# Patient Record
Sex: FEMALE | Race: ASIAN
[De-identification: names, ages, dates, MRNs, and addresses within clinical notes are randomized per-mention and may not be internally consistent; named-entity substitution may affect disease eponyms.]

---

## 2019-10-31 ENCOUNTER — HOSPITAL ENCOUNTER (EMERGENCY)
Dept: HOSPITAL 74 - JER | Age: 64
Discharge: HOME | End: 2019-10-31
Payer: COMMERCIAL

## 2019-10-31 VITALS — TEMPERATURE: 98 F | HEART RATE: 63 BPM | DIASTOLIC BLOOD PRESSURE: 69 MMHG | SYSTOLIC BLOOD PRESSURE: 143 MMHG

## 2019-10-31 VITALS — BODY MASS INDEX: 23.8 KG/M2
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
## 2019-10-31 DIAGNOSIS — S52.614A: Primary | ICD-10-CM

## 2019-10-31 DIAGNOSIS — I10: ICD-10-CM

## 2019-10-31 DIAGNOSIS — E11.9: ICD-10-CM

## 2019-10-31 LAB
ALBUMIN SERPL-MCNC: 4.3 G/DL (ref 3.4–5)
ALP SERPL-CCNC: 56 U/L (ref 45–117)
ALT SERPL-CCNC: 17 U/L (ref 13–61)
ANION GAP SERPL CALC-SCNC: 7 MMOL/L (ref 8–16)
APTT BLD: 35.1 SECONDS (ref 25.2–36.5)
AST SERPL-CCNC: 12 U/L (ref 15–37)
BASOPHILS # BLD: 0.7 % (ref 0–2)
BILIRUB SERPL-MCNC: 0.3 MG/DL (ref 0.2–1)
BUN SERPL-MCNC: 17.6 MG/DL (ref 7–18)
CALCIUM SERPL-MCNC: 9.3 MG/DL (ref 8.5–10.1)
CHLORIDE SERPL-SCNC: 104 MMOL/L (ref 98–107)
CO2 SERPL-SCNC: 28 MMOL/L (ref 21–32)
CREAT SERPL-MCNC: 0.8 MG/DL (ref 0.55–1.3)
DEPRECATED RDW RBC AUTO: 14.4 % (ref 11.6–15.6)
EOSINOPHIL # BLD: 1.1 % (ref 0–4.5)
GLUCOSE SERPL-MCNC: 119 MG/DL (ref 74–106)
HCT VFR BLD CALC: 38.3 % (ref 32.4–45.2)
HGB BLD-MCNC: 12.9 GM/DL (ref 10.7–15.3)
INR BLD: 0.99 (ref 0.83–1.09)
LYMPHOCYTES # BLD: 16.1 % (ref 8–40)
MAGNESIUM SERPL-MCNC: 2.5 MG/DL (ref 1.8–2.4)
MCH RBC QN AUTO: 27.7 PG (ref 25.7–33.7)
MCHC RBC AUTO-ENTMCNC: 33.7 G/DL (ref 32–36)
MCV RBC: 82.3 FL (ref 80–96)
MONOCYTES # BLD AUTO: 5.4 % (ref 3.8–10.2)
NEUTROPHILS # BLD: 76.7 % (ref 42.8–82.8)
PHOSPHATE SERPL-MCNC: 4.1 MG/DL (ref 2.5–4.9)
PLATELET # BLD AUTO: 178 K/MM3 (ref 134–434)
PMV BLD: 9.9 FL (ref 7.5–11.1)
POTASSIUM SERPLBLD-SCNC: 3.8 MMOL/L (ref 3.5–5.1)
PROT SERPL-MCNC: 8.5 G/DL (ref 6.4–8.2)
PT PNL PPP: 11.7 SEC (ref 9.7–13)
RBC # BLD AUTO: 4.65 M/MM3 (ref 3.6–5.2)
SODIUM SERPL-SCNC: 139 MMOL/L (ref 136–145)
WBC # BLD AUTO: 7.4 K/MM3 (ref 4–10)

## 2019-10-31 PROCEDURE — 3E033NZ INTRODUCTION OF ANALGESICS, HYPNOTICS, SEDATIVES INTO PERIPHERAL VEIN, PERCUTANEOUS APPROACH: ICD-10-PCS

## 2019-10-31 PROCEDURE — 2W3CX1Z IMMOBILIZATION OF RIGHT LOWER ARM USING SPLINT: ICD-10-PCS

## 2019-10-31 PROCEDURE — 3E0337Z INTRODUCTION OF ELECTROLYTIC AND WATER BALANCE SUBSTANCE INTO PERIPHERAL VEIN, PERCUTANEOUS APPROACH: ICD-10-PCS

## 2019-10-31 PROCEDURE — 3E033GC INTRODUCTION OF OTHER THERAPEUTIC SUBSTANCE INTO PERIPHERAL VEIN, PERCUTANEOUS APPROACH: ICD-10-PCS

## 2019-10-31 RX ADMIN — MORPHINE SULFATE PRN MG: 4 INJECTION, SOLUTION INTRAMUSCULAR; INTRAVENOUS at 08:56

## 2019-10-31 RX ADMIN — MORPHINE SULFATE PRN MG: 4 INJECTION, SOLUTION INTRAMUSCULAR; INTRAVENOUS at 07:57

## 2019-10-31 NOTE — PDOC
Attending Attestation





- Resident


Resident Name: Susi Castillo





- ED Attending Attestation


I have performed the following: I have examined & evaluated the patient, The 

case was reviewed & discussed with the resident, I agree w/resident's findings 

& plan, Exceptions are as noted





- HPI


HPI: 





10/31/19 09:53


64-year-old female with a history of non-insulin-dependent diabetes and 

hypertension presents emergency department with right wrist pain after a fall.  

Patient was washing the dishes at home, and was running after her grandson and 

she slipped on the wet floor falling on her outstretched right hand.  Denies 

head strike or other injuries.  Denies loss of consciousness.  Has not taken 

anything for pain.  She is otherwise been in her usual state of health, denies 

any headaches, dizziness, focal weakness or numbness, chest pain, shortness of 

breath, abdominal pain, nausea, vomiting, diarrhea, lower extremity edema.





- Physicial Exam


PE: 





10/31/19 09:54


GENERAL: Awake, alert, and fully oriented, in no acute distress. Pleasant in NAD


HEAD: No signs of trauma


EYES: PERRLA, EOMI, sclera anicteric, conjunctiva clear


ENT: Oropharynx clear without exudates. Moist mucosa


NECK: Normal ROM, supple, no lymphadenopathy, JVD, or masses


LUNGS: Breath sounds equal, clear to auscultation bilaterally.  No wheezes, and 

no crackles


HEART: Regular rate and rhythm, normal S1 and S2, no murmurs, rubs or gallops


ABDOMEN: Soft, nontender, normoactive bowel sounds.  No guarding, no rebound.  

No masses


EXTREMITIES: +mild R wrist deformity with moderate edema, +ulnar and radial 

ttp. Normal strength and sensation in RUE, able to give okay sign, thumbs up, 

oppose thumb against resistance. Normal abduction of fingers with resistance. 

No other bony ttp or gross deformities. 2+ pulses peripherally


NEUROLOGICAL:  Normal speech, cranial nerves intact, equal strength and 

sensation b/l


SKIN: Warm, Dry, normal turgor, no rashes or lesions noted.





- Medical Decision Making





10/31/19 10:27


65yo F presents to the ED with R wrist pain after mechanical fall


+distal radial and ulnar styloid fracture


XR, case reviewed with Dr. Zuluaga/SHABANA Deal


Recommend no reduction, sugar tong splint and f/u tomorrow in clinic


Pain well controlled


Sugar tong placed, NVI post splint


Pt clinically stable for DC home








I discussed the physical exam findings, ancillary test results and final 

diagnoses with the patient. I answered all of the patient's questions. The 

patient was satisfied with the care received and felt comfortable with the 

discharge plan and treatment plan. The patient will call their primary care 

physician within 24 hours to arrange follow-up and will return to the Emergency 

Department with any new, persistent or worsening symptoms.

## 2019-10-31 NOTE — PDOC
History of Present Illness





- General


Chief Complaint: Injury


Stated Complaint: PAIN,RT HAND


Time Seen by Provider: 10/31/19 05:55


History Source: Patient





- History of Present Illness


Initial Comments: 





10/31/19 07:44


63 yo F PMH of DM and HTN presents to clinic for R wrist and elbow pain 

following mechanical fall.  Pt states she fell on her outstretched R hand last 

night when she tripped on wet floor. Pt states pain is 10/10. Pt tried RICE and 

baclofen with little relief. Pt has increased pain with movement of wrist. pt 

states the pain radiates up to the elbow but does not extend past the elbow. pt 

denies hitting her head or LOC. denies CP or SOB 





Past History





- Past Medical History


Allergies/Adverse Reactions: 


 Allergies











Allergy/AdvReac Type Severity Reaction Status Date / Time


 


No Known Allergies Allergy   Verified 10/31/19 06:07











Home Medications: 


Ambulatory Orders





Amlodipine Besylate [Norvasc -] 5 mg PO DAILY 10/31/19 


Glipizide 5 mg PO DAILY 10/31/19 


Losartan/Hydrochlorothiazide [Losartan-Hctz 50-12.5 mg Tab] 1 each PO DAILY 10/

31/19 


metFORMIN HCL [Metformin HCl] 500 mg PO DAILY 10/31/19 








COPD: No





- Psycho Social/Smoking Cessation Hx


Smoking History: Never smoked


Information on smoking cessation initiated: No


Hx Alcohol Use: No


Drug/Substance Use Hx: No





**Review of Systems





- Review of Systems


Is the patient limited English proficient: Yes


Constitutional: No: Fever


HEENTM: No: Blurred Vision


Respiratory: No: Shortness of Breath


Cardiac (ROS): No: Chest Pain


ABD/GI: No: Nausea, Vomiting


Musculoskeletal: Yes: Joint Pain, Joint Swelling (R wrist ), Joint Stiffness


Integumentary: Yes: Bruising (R wrist )





*Physical Exam





- Vital Signs


 Last Vital Signs











Temp Pulse Resp BP Pulse Ox


 


 98.3 F   64   18   149/74   100 


 


 10/31/19 06:05  10/31/19 06:05  10/31/19 06:05  10/31/19 06:05  10/31/19 06:05














- Physical Exam


General Appearance: Yes: Nourished, Appropriately Dressed.  No: Apparent 

Distress


Respiratory/Chest: positive: Lungs Clear, Normal Breath Sounds.  negative: 

Accessory Muscle Use


Cardiovascular: positive: Regular Rhythm, Regular Rate, S1, S2


Gastrointestinal/Abdominal: positive: Normal Bowel Sounds, Soft.  negative: 

Tender


Extremity: positive: Normal Range of Motion, Swelling (R wrist )


Integumentary: positive: Warm, Swelling, Ecchymosis (R wrist )





ED Treatment Course





- LABORATORY


CBC & Chemistry Diagram: 


 10/31/19 07:45





 10/31/19 07:45





- ADDITIONAL ORDERS


Additional order review: 


 Laboratory  Results











  10/31/19





  07:34


 


POC Glucometer  110








 











  10/31/19





  07:34


 


POC Glucometer  110














- RADIOLOGY


Radiology Studies Ordered: 














 Category Date Time Status


 


 ELBOW-RIGHT [RAD] Stat Radiology  10/31/19 07:25 Ordered


 


 WRIST W/HAND-RIGHT* [RAD] Stat Radiology  10/31/19 07:25 Ordered














Medical Decision Making





- Medical Decision Making





10/31/19 07:42


63 yo F presenting with R wrist and elbow pain following mechanical fall





-r/o fracture





-XR R hand, wrist, elbow


-CBC, CMP, Mg Phos


-IV morphine 2mg q6h for for pain


-BGM, ISS for DM mgmt while in ED 


10/31/19 09:33


XR reviewed showing distal radial impaction fracture with ulnar styloid fracture


Ortho consulted, recommended sugar cane splint and f/u outpt tomorrow or monday


splint placed, pt neurovascular intact following procedure











Discharge





- Discharge Information


Problems reviewed: Yes


Clinical Impression/Diagnosis: 


 Fall





Disposition: HOME





- Admission


No





- Follow up/Referral


Referrals: 


Aram Zuluaga MD [Staff Physician] - 


Padilla Collins MD [Primary Care Provider] - 





- Patient Discharge Instructions


Patient Printed Discharge Instructions:  DI for Wrist Fracture


Additional Instructions: 


You came into the hospital with wrist pain. Your Xray shows that you have a 

wrist fracture. You have a splint on your arm to help stabilize and protect 

your wrist for healing. 





Please follow up with the orthopedic physician tomorrow. Call today to make the 

appointment for tomorrow. A referral has been included in this packet. 





You may alternate taking tylenol and advil for pain. Please do not exceed the 

daily dose on package instructions





Please avoid getting the splint on your arm wet. 





Return to the emergency department if you have any new, worsening, or 

concerning symptoms. 





- Post Discharge Activity